# Patient Record
(demographics unavailable — no encounter records)

---

## 2025-05-23 NOTE — DISCUSSION/SUMMARY
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   Giuseppe Bernabe MD [facility] DANETTE ORELLANA Director, Preventive Cardiology [facility] [place]       I have spent 45 minutes on the patient encounter including explaining and formulating rationale for treatment plan. >50% of time spent in direct counseling reviewing all tests, labs, and imaging and conferring with patient, family member, and other physicians regarding patient care.  Time Spent excludes separately reported billable services/teaching during the encounter.                                    -                                    -                                                                                                                                                                                                                                           --                                                                                                                                                                                                                                                                                                                                                                                                                                                                                                                                                                                                                                                                                                                            -----------  [EKG obtained to assist in diagnosis and management of assessed problem(s)] : EKG obtained to assist in diagnosis and management of assessed problem(s)

## 2025-05-23 NOTE — DISCUSSION/SUMMARY
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   Giuseppe Bernabe MD [facility] DANETTE ORELLANA Director, Preventive Cardiology [facility] [place]       I have spent 45 minutes on the patient encounter including explaining and formulating rationale for treatment plan. >50% of time spent in direct counseling reviewing all tests, labs, and imaging and conferring with patient, family member, and other physicians regarding patient care.  Time Spent excludes separately reported billable services/teaching during the encounter.                                    -                                    -                                                                                                                                                                                                                                           --                                                                                                                                                                                                                                                                                                                                                                                                                                                                                                                                                                                                                                                                                                                            -----------  [EKG obtained to assist in diagnosis and management of assessed problem(s)] : EKG obtained to assist in diagnosis and management of assessed problem(s)

## 2025-05-29 NOTE — REASON FOR VISIT
[FreeTextEntry1] : 71-year-old female presenting with episodes of palpitations and tachycardia since March. Patient reports regular fast heartbeats around 158-160 bpm, with the longest episode lasting about 5 minutes. Recent episode on Memorial Day was irregular and felt different.  Patient reports onset of palpitations in March 2025. Episodes characterized by regular fast heartbeats around 158-160 bpm. Longest episode lasted about 5 minutes on Memorial Day, which felt irregular and different from previous episodes. Patient has experienced episodes in March, twice in April (including Easter), and recently in May. Patient self-discontinued caffeinated coffee. Currently on Toprol 100mg for blood pressure management for about 8 years. Recent blood pressure readings around 140/80s. She checked with a home rhythm monitor device and one episode was "possible AFib" the other episode appeared to be a regular SVT at 158 bpm.  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 Walks 3 miles a day when possible. Recently traveled to [place] and [place] for family events. - Review of Systems : Cardiovascular Positive for palpitations and tachycardia. Negative for chest pain, shortness of breath, or syncope. - Medications : Toprol 100mg daily for hypertension - Allergies : No known allergies reported Objective: - Diagnostic Results : ECG Normal sinus rhythm in the 60s. Previous ECG tracings show episodes of regular tachycardia at 150+ bpm and possible brief atrial fibrillation. Echocardiogram Normal ventricular function, mild mitral regurgitation, mild tricuspid regurgitation. Lipid panel Total cholesterol 238, HDL 92, , Triglycerides normal. - Vital Signs : Blood pressure 150/88 mmHg, Heart rate 60s bpm - Physical Examination (PE) : Cardiovascular Regular rate and rhythm, no murmurs. Respiratory Lungs clear to auscultation. Abdominal Soft, non-tender, no organomegaly. Extremities Normal pulses. Assessment: - Summary : 71 year-old female with new-onset palpitations and tachycardia, possibly supraventricular tachycardia (SVT) and/or paroxysmal atrial fibrillation. Patient has a history of hypertension, currently not optimally controlled. Recent lipid panel shows elevated LDL cholesterol but excellent HDL - Problems : - Paroxysmal tachycardia, SVT  and possible atrial fibrillation  - Hypertension, suboptimally controlled  - Hyperlipidemia  - Differential Diagnosis : - Supraventricular tachycardia (SVT)  - Paroxysmal atrial fibrillation  - Atrial tachycardia  Plan: - Summary : Initiate extended cardiac monitoring to better characterize arrhythmia. Adjust antihypertensive medication regimen. Address hyperlipidemia through dietary modifications. Discuss potential future management options based on monitoring results. - Plan : - Start amlodipine (Norvasc) 5mg daily for blood pressure control  - Reduce metoprolol (Toprol) dose by half  - Initiate extended cardiac monitoring for 2 weeks  - Patient to monitor blood pressure at home and report any significant changes  - Encourage dietary modifications to address hyperlipidemia  - Follow-up in one month to review monitoring results and blood pressure control  - Defer decision on anticoagulation pending confirmation of atrial fibrillation  - Discuss potential future management options, including ablation therapy, based on monitoring results  - Patient advised to call if symptoms worsen or new symptoms develop